# Patient Record
Sex: MALE
[De-identification: names, ages, dates, MRNs, and addresses within clinical notes are randomized per-mention and may not be internally consistent; named-entity substitution may affect disease eponyms.]

---

## 2018-07-03 ENCOUNTER — HOSPITAL (OUTPATIENT)
Dept: OTHER | Age: 23
End: 2018-07-03
Attending: EMERGENCY MEDICINE

## 2018-07-03 LAB
ANALYZER ANC (IANC): ABNORMAL
ANION GAP SERPL CALC-SCNC: 11 MMOL/L (ref 10–20)
BASOPHILS # BLD: 0 THOUSAND/MCL (ref 0–0.3)
BASOPHILS NFR BLD: 0 %
BUN SERPL-MCNC: 14 MG/DL (ref 6–20)
BUN/CREAT SERPL: 15 (ref 7–25)
BURR CELLS (BURC): ABNORMAL
CALCIUM SERPL-MCNC: 8.9 MG/DL (ref 8.4–10.2)
CHLORIDE: 101 MMOL/L (ref 98–107)
CO2 SERPL-SCNC: 26 MMOL/L (ref 21–32)
CREAT SERPL-MCNC: 0.96 MG/DL (ref 0.67–1.17)
DIFFERENTIAL METHOD BLD: ABNORMAL
EOSINOPHIL # BLD: 0.1 THOUSAND/MCL (ref 0.1–0.5)
EOSINOPHIL NFR BLD: 1 %
ERYTHROCYTE [DISTWIDTH] IN BLOOD: 12.8 % (ref 11–15)
GLUCOSE SERPL-MCNC: 123 MG/DL (ref 65–99)
HEMATOCRIT: 41.6 % (ref 39–51)
HGB BLD-MCNC: 14.4 GM/DL (ref 13–17)
LYMPHOCYTES # BLD: 1.8 THOUSAND/MCL (ref 1–4.8)
LYMPHOCYTES NFR BLD: 13 %
MCH RBC QN AUTO: 28.2 PG (ref 26–34)
MCHC RBC AUTO-ENTMCNC: 34.6 GM/DL (ref 32–36.5)
MCV RBC AUTO: 81.4 FL (ref 78–100)
MONOCYTES # BLD: 1.6 THOUSAND/MCL (ref 0.3–0.9)
MONOCYTES NFR BLD: 11 %
NEUTROPHILS # BLD: 10.6 THOUSAND/MCL (ref 1.8–7.7)
NEUTROPHILS NFR BLD: 75 %
NEUTS SEG NFR BLD: ABNORMAL %
NRBC (NRBCRE): ABNORMAL
PLATELET # BLD: 301 THOUSAND/MCL (ref 140–450)
POTASSIUM SERPL-SCNC: 3.2 MMOL/L (ref 3.4–5.1)
RBC # BLD: 5.11 MILLION/MCL (ref 4.5–5.9)
SODIUM SERPL-SCNC: 135 MMOL/L (ref 135–145)
WBC # BLD: 14.1 THOUSAND/MCL (ref 4.2–11)

## 2018-07-30 ENCOUNTER — HOSPITAL (OUTPATIENT)
Dept: OTHER | Age: 23
End: 2018-07-30
Attending: EMERGENCY MEDICINE

## 2023-12-27 ENCOUNTER — HOSPITAL ENCOUNTER (EMERGENCY)
Facility: HOSPITAL | Age: 28
Discharge: LEFT BEFORE DISCHARGE | End: 2023-12-29
Payer: MEDICAID

## 2024-01-22 ENCOUNTER — TELEPHONE (OUTPATIENT)
Dept: INTERNAL MEDICINE CLINIC | Facility: CLINIC | Age: 29
End: 2024-01-22

## 2024-01-22 ENCOUNTER — OFFICE VISIT (OUTPATIENT)
Dept: INTERNAL MEDICINE CLINIC | Facility: CLINIC | Age: 29
End: 2024-01-22
Payer: COMMERCIAL

## 2024-01-22 VITALS
BODY MASS INDEX: 31.55 KG/M2 | HEIGHT: 69 IN | DIASTOLIC BLOOD PRESSURE: 71 MMHG | SYSTOLIC BLOOD PRESSURE: 125 MMHG | WEIGHT: 213 LBS | HEART RATE: 89 BPM

## 2024-01-22 DIAGNOSIS — J45.50 SEVERE PERSISTENT ASTHMA, UNSPECIFIED WHETHER COMPLICATED: ICD-10-CM

## 2024-01-22 DIAGNOSIS — G89.29 CHRONIC PAIN OF LEFT KNEE: ICD-10-CM

## 2024-01-22 DIAGNOSIS — F41.9 ANXIETY AND DEPRESSION: ICD-10-CM

## 2024-01-22 DIAGNOSIS — E66.09 CLASS 1 OBESITY DUE TO EXCESS CALORIES WITH BODY MASS INDEX (BMI) OF 31.0 TO 31.9 IN ADULT, UNSPECIFIED WHETHER SERIOUS COMORBIDITY PRESENT: ICD-10-CM

## 2024-01-22 DIAGNOSIS — F90.2 ATTENTION DEFICIT HYPERACTIVITY DISORDER (ADHD), COMBINED TYPE: ICD-10-CM

## 2024-01-22 DIAGNOSIS — E55.9 VITAMIN D DEFICIENCY: ICD-10-CM

## 2024-01-22 DIAGNOSIS — Z23 NEED FOR TETANUS, DIPHTHERIA, AND ACELLULAR PERTUSSIS (TDAP) VACCINE: Primary | ICD-10-CM

## 2024-01-22 DIAGNOSIS — M25.562 CHRONIC PAIN OF LEFT KNEE: ICD-10-CM

## 2024-01-22 DIAGNOSIS — F32.A ANXIETY AND DEPRESSION: ICD-10-CM

## 2024-01-22 DIAGNOSIS — R06.81 WITNESSED EPISODE OF APNEA: ICD-10-CM

## 2024-01-22 DIAGNOSIS — Z00.00 ANNUAL PHYSICAL EXAM: ICD-10-CM

## 2024-01-22 PROCEDURE — 90471 IMMUNIZATION ADMIN: CPT | Performed by: STUDENT IN AN ORGANIZED HEALTH CARE EDUCATION/TRAINING PROGRAM

## 2024-01-22 PROCEDURE — 3078F DIAST BP <80 MM HG: CPT | Performed by: STUDENT IN AN ORGANIZED HEALTH CARE EDUCATION/TRAINING PROGRAM

## 2024-01-22 PROCEDURE — 99385 PREV VISIT NEW AGE 18-39: CPT | Performed by: STUDENT IN AN ORGANIZED HEALTH CARE EDUCATION/TRAINING PROGRAM

## 2024-01-22 PROCEDURE — 3074F SYST BP LT 130 MM HG: CPT | Performed by: STUDENT IN AN ORGANIZED HEALTH CARE EDUCATION/TRAINING PROGRAM

## 2024-01-22 PROCEDURE — 99214 OFFICE O/P EST MOD 30 MIN: CPT | Performed by: STUDENT IN AN ORGANIZED HEALTH CARE EDUCATION/TRAINING PROGRAM

## 2024-01-22 PROCEDURE — 3008F BODY MASS INDEX DOCD: CPT | Performed by: STUDENT IN AN ORGANIZED HEALTH CARE EDUCATION/TRAINING PROGRAM

## 2024-01-22 PROCEDURE — 90715 TDAP VACCINE 7 YRS/> IM: CPT | Performed by: STUDENT IN AN ORGANIZED HEALTH CARE EDUCATION/TRAINING PROGRAM

## 2024-01-22 RX ORDER — NAPROXEN 500 MG/1
500 TABLET ORAL 2 TIMES DAILY PRN
Qty: 60 TABLET | Refills: 0 | Status: SHIPPED | OUTPATIENT
Start: 2024-01-22 | End: 2024-01-22

## 2024-01-22 RX ORDER — ESCITALOPRAM OXALATE 20 MG/1
20 TABLET ORAL DAILY
Qty: 90 TABLET | Refills: 1 | Status: SHIPPED | OUTPATIENT
Start: 2024-01-22

## 2024-01-22 RX ORDER — ALBUTEROL SULFATE 90 UG/1
1 AEROSOL, METERED RESPIRATORY (INHALATION)
Qty: 3 EACH | Refills: 9 | Status: SHIPPED | OUTPATIENT
Start: 2024-01-22

## 2024-01-22 RX ORDER — FLUTICASONE PROPIONATE AND SALMETEROL 100; 50 UG/1; UG/1
1 POWDER RESPIRATORY (INHALATION) 2 TIMES DAILY
Qty: 1 EACH | Refills: 4 | Status: SHIPPED | OUTPATIENT
Start: 2024-01-22 | End: 2025-01-21

## 2024-01-22 RX ORDER — MELOXICAM 15 MG/1
15 TABLET ORAL
Qty: 90 TABLET | Refills: 0 | Status: SHIPPED | OUTPATIENT
Start: 2024-01-22 | End: 2024-04-21

## 2024-01-22 RX ORDER — ESCITALOPRAM OXALATE 10 MG/1
TABLET ORAL
Qty: 90 TABLET | Refills: 0 | Status: SHIPPED | OUTPATIENT
Start: 2024-01-22 | End: 2024-01-22

## 2024-01-22 NOTE — PROGRESS NOTES
History of Present Illness   Patient ID: Miguelangel Johnson is a 28 year old male.  Chief Complaint: Physical and Asthma      Miguelangel Johnson is a pleasant 28 year old male with PMHx of of Asthma, MDD/MIKE (childhood on abilify and lexapro), who presents to [facility] care,  annual physical exam and noted witnessed apnea at work. Miguelangel Johnson has ongoing shortness of breath and ran out of controller inhaler and nocturia and polydipsia. Drinks 4 cups of coffee a day and left knee/ankle pain flare up since teenage years.     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 Lost weight.     Health Maintenance  - All care gaps addressed with patient.   Health Maintenance Due   Topic Date Due    Annual Physical  Never done    COVID-19 Vaccine (1) Never done    DTaP,Tdap,and Td Vaccines (1 - Tdap) Never done    Influenza Vaccine (1) Never done    Annual Depression Screening  Never done       Review of Systems  Review of Systems   Constitutional: Negative.    Eyes: Negative.    Respiratory:  Positive for cough, shortness of breath and wheezing.    Cardiovascular: Negative.    Gastrointestinal: Negative.    Endocrine: Negative.    Genitourinary: Negative.    Musculoskeletal:  Positive for arthralgias (left knee and left ankle.).   Skin: Negative.    Allergic/Immunologic: Negative.    Neurological: Negative.    Hematological: Negative.    Psychiatric/Behavioral:  Negative for suicidal ideas. The patient is nervous/anxious and is hyperactive.        Physical Exam  Vitals:    01/22/24 0906 01/22/24 0915   BP: 136/75 125/71   Pulse: 82 89   Weight: 213 lb (96.6 kg)    Height: 5' 9\" (1.753 m)      Body mass index is 31.45 kg/m².  BP Readings from Last 3 Encounters:   01/22/24 125/71     Physical Exam  Vitals reviewed.   Constitutional:       Appearance: Normal appearance.   HENT:      Head: Normocephalic and  atraumatic.      Right Ear: Tympanic membrane normal.      Left Ear: Tympanic membrane normal.      Nose: Nose normal.   Eyes:      Extraocular Movements: Extraocular movements intact.      Pupils: Pupils are equal, round, and reactive to light.   Cardiovascular:      Rate and Rhythm: Normal rate and regular rhythm.      Pulses: Normal pulses.      Heart sounds: Normal heart sounds.   Pulmonary:      Effort: Pulmonary effort is normal.      Breath sounds: Normal breath sounds.   Abdominal:      General: Bowel sounds are normal.      Palpations: Abdomen is soft.   Musculoskeletal:         General: Normal range of motion.      Cervical back: Normal range of motion and neck supple.   Skin:     Capillary Refill: Capillary refill takes less than 2 seconds.   Neurological:      General: No focal deficit present.      Mental Status: He is alert and oriented to person, place, and time. Mental status is at baseline.   Psychiatric:         Mood and Affect: Mood normal.           Labs & Imaging  Pertinent labs and imaging reviewed.   No results found for: \"GLU\", \"BUN\", \"BUNCREA\", \"CREATSERUM\", \"ANIONGAP\", \"GFR\", \"GFRNAA\", \"GFRAA\", \"CA\", \"OSMOCALC\", \"ALKPHO\", \"AST\", \"ALT\", \"ALKPHOS\", \"BILT\", \"TP\", \"ALB\", \"GLOBULIN\", \"AGRATIO\", \"NA\", \"K\", \"CL\", \"CO2\"  No results found for: \"EAG\", \"A1C\"  No results found for: \"WBC\", \"RBC\", \"HGB\", \"HCT\", \"MCV\", \"MCH\", \"MCHC\", \"RDW\", \"PLT\", \"MPV\"  No results found for: \"CHOLEST\", \"TRIG\", \"HDL\", \"LDL\", \"VLDL\", \"TCHDLRATIO\", \"NONHDLC\", \"CHOLHDLRATIO\", \"CALCNONHDL\"  The ASCVD Risk score (Dinesh ZHENG, et al., 2019) failed to calculate for the following reasons:    The 2019 ASCVD risk score is only valid for ages 40 to 79    Medical History    Reviewed allergies:  Allergies   Allergen Reactions    Augmentin [Amoxicillin-Pot Clavulanate] HIVES        Reviewed:  Patient Active Problem List    Diagnosis    Severe persistent asthma without complication      Reviewed:  Past Medical History:   Diagnosis Date     Asthma       Reviewed:  Family History   Problem Relation Age of Onset    Arthritis Mother     Other (OLI) Mother     Obesity Mother     Other (acciental fentanyl overdose) Sister     Crohn's Disease Brother     Other (appendectomy) Brother     Other (Alz Dementia) Maternal Grandmother     Other (lung cancer) Maternal Grandfather     Other (tobacco smoke) Maternal Grandfather        Reviewed:  Past Surgical History:   Procedure Laterality Date    HAND SURGERY Right 2016    fractured right 5th metacarpal      Reviewed:  Social History     Socioeconomic History    Marital status: Single   Tobacco Use    Smoking status: Some Days     Packs/day: .02     Types: Cigarettes     Start date: 2001    Smokeless tobacco: Never    Tobacco comments:     Pt smokes 2 a week and is cutting back trying to quit (stared 10y/o only a few cigarettes a week).   Vaping Use    Vaping Use: Every day    Substances: Nicotine, Flavoring    Devices: Disposable   Substance and Sexual Activity    Alcohol use: Yes     Comment: social    Drug use: Yes     Types: Cannabis     Comment: disp    Sexual activity: Yes     Partners: Female     Comment: 3-4 female      Reviewed:  Current Outpatient Medications   Medication Sig Dispense Refill    fluticasone-salmeterol 100-50 MCG/ACT Inhalation Aerosol Powder, Breath Activated Inhale 1 puff into the lungs 2 (two) times daily. 1 each 4    albuterol 108 (90 Base) MCG/ACT Inhalation Aero Soln Inhale 1 puff into the lungs every 4 to 6 hours as needed. FOR ASTHMA. Pharmacist, please switch to formulary alternative per insurance coverage, ok to replace with proair, ventolin, proventil, or any other albuterol inhaler. -drkp 3 each 9    escitalopram 10 MG Oral Tab Take 1 tablet (10 mg total) by mouth daily for 30 days, THEN 2 tablets (20 mg total) daily. 90 tablet 0    Meloxicam 15 MG Oral Tab Take 1 tablet (15 mg total) by mouth daily as needed for Pain. 90 tablet 0          Assessment & Plan    1. Annual physical  exam  - TSH W Reflex To Free T4; Future  - Lipid Panel; Future  - CBC With Differential With Platelet; Future  - Hemoglobin A1C; Future  - Comp Metabolic Panel (14); Future  - EKG 12 Lead  Pt here for establish care and physical examination  Plan:  Order TSH, Lipid panle, CBC, A1c, CMP, Vitamin D  -Obtain EKG  -pt deferred STD screening for now (had 3-4 sexual partners past year)      2. Witnessed episode of apnea  - DIAGOSTIC SLEEP STUDY  - General sleep study; Future  Pt STOP-BANG score of 5 (high   STOP-BANG Score for Obstructive Sleep Apnea from [employer]  on 1/22/2024  ** All calculations should be rechecked by clinician prior to use **    RESULT SUMMARY:  5 points  STOP-BANG    High   Risk for moderate to severe OLI      INPUTS:  Do you snore loudly? --> 1 = Yes  Do you often feel tired, fatigued, or sleepy during the daytime? --> 1 = Yes  Has anyone observed you stop breathing during sleep? --> 1 = Yes  Do you have (or are you being treated for) high blood pressure? --> 0 = No  BMI --> 0 = ?35 kg/m²  Age --> 0 = ?50 years  Neck circumference --> 1 = >40 cm  Gender --> 1 = Male  Plan:  -referred for Sleep study, avoid caffeine after 12pm  3. Class 1 obesity due to excess calories with body mass index (BMI) of 31.0 to 31.9 in adult, unspecified whether serious comorbidity present  I recommend to eat a more balanced mediterranean diet (eat more vegetables and fruits) more omega 3 fatty acids, lean protein (chicken, turkey, seafood), less process/fried fatty foods, less red met, and mixed aerobic exercise 180 minutes a week and intermittent strength training.    4. Vitamin D deficiency  - Vitamin D; Future    5. Need for tetanus, diphtheria, and acellular pertussis (Tdap) vaccine  - TdaP (Boostrix) Vaccine (> 7 Y)  Tdap given in clinic today  6. Severe persistent asthma, unspecified whether complicated  - fluticasone-salmeterol 100-50 MCG/ACT Inhalation Aerosol Powder, Breath Activated; Inhale 1 puff into the  lungs 2 (two) times daily.  Dispense: 1 each; Refill: 4  - albuterol 108 (90 Base) MCG/ACT Inhalation Aero Soln; Inhale 1 puff into the lungs every 4 to 6 hours as needed. FOR ASTHMA. Pharmacist, please switch to formulary alternative per insurance coverage, ok to replace with proair, ventolin, proventil, or any other albuterol inhaler. -drkp  Dispense: 3 each; Refill: 9  Pt with chronic history of severe persistent asthma (history of hospitalizations),   Uses rescue inhaler 2-3 days a week  Plan:  -Start Advair discus (BID) lower dose, and refill albuterol    7. Chronic pain of left knee  - XR KNEE (3 VIEWS), LEFT (CPT=73562); Future  - Sed Rate, Westergren (Automated) [E]; Future  - C-Reactive Protein [E]; Future  - Ortho Referral - [facility] ([facility])  Pt with chronic left knee pain and intermittent left ankle pain. FMHx of RA/OA ?(Mother)  Plan  -Obtain inflammatory markers (ESR, CRP)  -Xray of left knee  -Refer to ortho if inflammatory markers negative for further evaluation  8. Attention deficit hyperactivity disorder (ADHD), combined type  - Psychiatry Referral - In Network  Pt with ADHD/Depression/Anxiety since teenage years (off medications for years was previously on abilify and lexapro. Denies Suicidal ideation and homicidal ideation   Plan:  -start lexapro 10mg po daily for 30 days and titrate to 20mg lexapro daily  -Follow up within the next month  -Refer to Psychiatry for further medication management and evaulation.   9. Anxiety and depression  - Psychiatry Referral - In Network  Plan  As above         Follow Up:   Return in about 2 weeks (around 2/5/2024) for Review test/ lab results.      Marlon Grant MD  Internal Medicine      Patient asked to sign release of information for outside records if not already requested, make future office/imaging appointments at the  prior to leaving, and to sign up for [employer] if not already active.  Preventive measures and further education  discussed with patient as per after visit summary. Potential medication side effects discussed. All questions answered to best of ability.   Call office with any questions. Seek emergency care if necessary.   Patient understands and agrees to follow directions and advice.      ----------------------------------------- PATIENT INSTRUCTIONS-----------------------------------------     There are no Patient Instructions on file for this visit.

## 2024-01-22 NOTE — TELEPHONE ENCOUNTER
The pharmacist says that the issue is that the insurance covers only one tablet/day.  Will dispense the 10mg prescription for #30 but pt will need a new prescription for 20mg tablets (1 per day). Please review pended prescription.

## 2024-01-22 NOTE — TELEPHONE ENCOUNTER
Patient seen today in office by Dr. Maroln Grant. Would like to change pcp to Dr. Marlon Grant. Advised patient to call insurance. please remove any prior pcp and change to Marlon Grant  Pcp removal order placed

## 2024-01-22 NOTE — TELEPHONE ENCOUNTER
Plan does not cover this medication please call [phone] to initiate a prior authorization  or call pharmacy to change medication       escitalopram 10 MG Oral Tab, Take 1 tablet (10 mg total) by mouth daily for 30 days, THEN 2 tablets (20 mg total) daily., Disp: 90 tablet, Rfl: 0

## 2024-01-23 ENCOUNTER — PATIENT OUTREACH (OUTPATIENT)
Dept: CASE MANAGEMENT | Age: 29
End: 2024-01-23

## 2024-01-23 NOTE — TELEPHONE ENCOUNTER
Noted just sent scrip for 20mg daily.(Half pill for 30 days then whole pill after one month. Thank you so much

## 2024-01-23 NOTE — PROCEDURES
Received order requesting to update PCP to Dr. Marlon Grant is Approved and finalized on January 23, 2024.    Thanks,  [facility] Team

## (undated) NOTE — LETTER
[address] [address]  PH: [phone]  FAX: [phone]    01/22/24            To Whom It May Concern:      Miguelangel Johnson, is currently under my medical care. He may return to work Tuesday, 1/23/24 full duty with no work restrictions.    If you require additional information please contact our office.                    Sincerely,              Marlon Grant MD.